# Patient Record
Sex: FEMALE | Race: WHITE | Employment: OTHER | URBAN - METROPOLITAN AREA
[De-identification: names, ages, dates, MRNs, and addresses within clinical notes are randomized per-mention and may not be internally consistent; named-entity substitution may affect disease eponyms.]

---

## 2020-01-01 ENCOUNTER — ANESTHESIA (OUTPATIENT)
Dept: EMERGENCY DEPT | Facility: HOSPITAL | Age: 85
DRG: 208 | End: 2020-01-01
Payer: MEDICARE

## 2020-01-01 ENCOUNTER — HOSPITAL ENCOUNTER (OUTPATIENT)
Facility: HOSPITAL | Age: 85
Setting detail: OBSERVATION
Discharge: NURSING, REHAB OR LONG-TERM CARE | End: 2020-01-01
Attending: EMERGENCY MEDICINE | Admitting: HOSPITALIST
Payer: MEDICARE

## 2020-01-01 ENCOUNTER — APPOINTMENT (OUTPATIENT)
Dept: GENERAL RADIOLOGY | Age: 85
End: 2020-01-01
Attending: EMERGENCY MEDICINE
Payer: MEDICARE

## 2020-01-01 ENCOUNTER — APPOINTMENT (OUTPATIENT)
Dept: CV DIAGNOSTICS | Facility: HOSPITAL | Age: 85
DRG: 208 | End: 2020-01-01
Attending: HOSPITALIST
Payer: MEDICARE

## 2020-01-01 ENCOUNTER — APPOINTMENT (OUTPATIENT)
Dept: CT IMAGING | Age: 85
End: 2020-01-01
Attending: EMERGENCY MEDICINE
Payer: MEDICARE

## 2020-01-01 ENCOUNTER — HOSPITAL ENCOUNTER (INPATIENT)
Facility: HOSPITAL | Age: 85
LOS: 4 days | DRG: 208 | End: 2020-01-01
Attending: EMERGENCY MEDICINE | Admitting: HOSPITALIST
Payer: MEDICARE

## 2020-01-01 ENCOUNTER — APPOINTMENT (OUTPATIENT)
Dept: GENERAL RADIOLOGY | Facility: HOSPITAL | Age: 85
DRG: 208 | End: 2020-01-01
Attending: EMERGENCY MEDICINE
Payer: MEDICARE

## 2020-01-01 ENCOUNTER — ANESTHESIA EVENT (OUTPATIENT)
Dept: EMERGENCY DEPT | Facility: HOSPITAL | Age: 85
DRG: 208 | End: 2020-01-01
Payer: MEDICARE

## 2020-01-01 ENCOUNTER — APPOINTMENT (OUTPATIENT)
Dept: GENERAL RADIOLOGY | Facility: HOSPITAL | Age: 85
DRG: 208 | End: 2020-01-01
Attending: INTERNAL MEDICINE
Payer: MEDICARE

## 2020-01-01 VITALS
DIASTOLIC BLOOD PRESSURE: 44 MMHG | WEIGHT: 141 LBS | SYSTOLIC BLOOD PRESSURE: 130 MMHG | RESPIRATION RATE: 17 BRPM | HEART RATE: 72 BPM | OXYGEN SATURATION: 95 % | TEMPERATURE: 98 F | BODY MASS INDEX: 23 KG/M2

## 2020-01-01 VITALS
WEIGHT: 148.13 LBS | BODY MASS INDEX: 27.26 KG/M2 | HEART RATE: 109 BPM | HEIGHT: 62 IN | SYSTOLIC BLOOD PRESSURE: 142 MMHG | OXYGEN SATURATION: 79 % | RESPIRATION RATE: 16 BRPM | DIASTOLIC BLOOD PRESSURE: 46 MMHG | TEMPERATURE: 101 F

## 2020-01-01 DIAGNOSIS — I50.9 ACUTE ON CHRONIC CONGESTIVE HEART FAILURE, UNSPECIFIED HEART FAILURE TYPE (HCC): ICD-10-CM

## 2020-01-01 DIAGNOSIS — R53.1 WEAKNESS: Primary | ICD-10-CM
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
## 2020-01-01 DIAGNOSIS — J81.0 ACUTE PULMONARY EDEMA (HCC): Primary | ICD-10-CM

## 2020-01-01 DIAGNOSIS — J96.91 RESPIRATORY FAILURE WITH HYPOXIA, UNSPECIFIED CHRONICITY (HCC): ICD-10-CM

## 2020-01-01 PROCEDURE — 99213 OFFICE O/P EST LOW 20 MIN: CPT | Performed by: FAMILY MEDICINE

## 2020-01-01 PROCEDURE — 71045 X-RAY EXAM CHEST 1 VIEW: CPT | Performed by: EMERGENCY MEDICINE

## 2020-01-01 PROCEDURE — 5A1945Z RESPIRATORY VENTILATION, 24-96 CONSECUTIVE HOURS: ICD-10-PCS | Performed by: ANESTHESIOLOGY

## 2020-01-01 PROCEDURE — 99225 SUBSEQUENT OBSERVATION CARE: CPT | Performed by: HOSPITALIST

## 2020-01-01 PROCEDURE — 71045 X-RAY EXAM CHEST 1 VIEW: CPT | Performed by: INTERNAL MEDICINE

## 2020-01-01 PROCEDURE — 93306 TTE W/DOPPLER COMPLETE: CPT | Performed by: HOSPITALIST

## 2020-01-01 PROCEDURE — 99223 1ST HOSP IP/OBS HIGH 75: CPT | Performed by: HOSPITALIST

## 2020-01-01 PROCEDURE — 99202 OFFICE O/P NEW SF 15 MIN: CPT | Performed by: FAMILY MEDICINE

## 2020-01-01 PROCEDURE — 99223 1ST HOSP IP/OBS HIGH 75: CPT | Performed by: NURSE PRACTITIONER

## 2020-01-01 PROCEDURE — 99233 SBSQ HOSP IP/OBS HIGH 50: CPT | Performed by: HOSPITALIST

## 2020-01-01 PROCEDURE — 99220 INITIAL OBSERVATION CARE,LEVL III: CPT | Performed by: HOSPITALIST

## 2020-01-01 PROCEDURE — 0BH18EZ INSERTION OF ENDOTRACHEAL AIRWAY INTO TRACHEA, VIA NATURAL OR ARTIFICIAL OPENING ENDOSCOPIC: ICD-10-PCS | Performed by: ANESTHESIOLOGY

## 2020-01-01 PROCEDURE — 99233 SBSQ HOSP IP/OBS HIGH 50: CPT | Performed by: NURSE PRACTITIONER

## 2020-01-01 PROCEDURE — 70450 CT HEAD/BRAIN W/O DYE: CPT | Performed by: EMERGENCY MEDICINE

## 2020-01-01 PROCEDURE — 99217 OBSERVATION CARE DISCHARGE: CPT | Performed by: HOSPITALIST

## 2020-01-01 PROCEDURE — 99232 SBSQ HOSP IP/OBS MODERATE 35: CPT | Performed by: HOSPITALIST

## 2020-01-01 PROCEDURE — 36620 INSERTION CATHETER ARTERY: CPT | Performed by: NURSE PRACTITIONER

## 2020-01-01 PROCEDURE — 99232 SBSQ HOSP IP/OBS MODERATE 35: CPT | Performed by: INTERNAL MEDICINE

## 2020-01-01 RX ORDER — ACETAMINOPHEN 160 MG/5ML
650 SOLUTION ORAL EVERY 6 HOURS PRN
Status: DISCONTINUED | OUTPATIENT
Start: 2020-01-01 | End: 2020-04-23

## 2020-01-01 RX ORDER — CHLORTHALIDONE 25 MG/1
25 TABLET ORAL DAILY
Status: ON HOLD | COMMUNITY
Start: 2017-03-28 | End: 2020-01-01

## 2020-01-01 RX ORDER — ENOXAPARIN SODIUM 100 MG/ML
30 INJECTION SUBCUTANEOUS DAILY
Status: DISCONTINUED | OUTPATIENT
Start: 2020-01-01 | End: 2020-01-01 | Stop reason: ALTCHOICE

## 2020-01-01 RX ORDER — FAMOTIDINE 10 MG/ML
20 INJECTION, SOLUTION INTRAVENOUS 2 TIMES DAILY
Status: DISCONTINUED | OUTPATIENT
Start: 2020-01-01 | End: 2020-01-01

## 2020-01-01 RX ORDER — POTASSIUM CHLORIDE 29.8 MG/ML
40 INJECTION INTRAVENOUS ONCE
Status: COMPLETED | OUTPATIENT
Start: 2020-01-01 | End: 2020-01-01

## 2020-01-01 RX ORDER — SCOLOPAMINE TRANSDERMAL SYSTEM 1 MG/1
1 PATCH, EXTENDED RELEASE TRANSDERMAL
Status: DISCONTINUED | OUTPATIENT
Start: 2020-01-01 | End: 2020-04-23

## 2020-01-01 RX ORDER — CHLORHEXIDINE GLUCONATE 0.12 MG/ML
15 RINSE ORAL
Status: DISCONTINUED | OUTPATIENT
Start: 2020-01-01 | End: 2020-01-01

## 2020-01-01 RX ORDER — MORPHINE SULFATE 4 MG/ML
1 INJECTION, SOLUTION INTRAMUSCULAR; INTRAVENOUS ONCE
Status: DISCONTINUED | OUTPATIENT
Start: 2020-01-01 | End: 2020-01-01

## 2020-01-01 RX ORDER — FUROSEMIDE 10 MG/ML
80 INJECTION INTRAMUSCULAR; INTRAVENOUS ONCE
Status: COMPLETED | OUTPATIENT
Start: 2020-01-01 | End: 2020-01-01

## 2020-01-01 RX ORDER — POTASSIUM CHLORIDE 20 MEQ/1
40 TABLET, EXTENDED RELEASE ORAL EVERY 4 HOURS
Status: COMPLETED | OUTPATIENT
Start: 2020-01-01 | End: 2020-01-01

## 2020-01-01 RX ORDER — MIDAZOLAM HYDROCHLORIDE 5 MG/ML
2.5 INJECTION INTRAMUSCULAR; INTRAVENOUS ONCE
Status: COMPLETED | OUTPATIENT
Start: 2020-01-01 | End: 2020-01-01

## 2020-01-01 RX ORDER — ENOXAPARIN SODIUM 100 MG/ML
40 INJECTION SUBCUTANEOUS DAILY
Status: DISCONTINUED | OUTPATIENT
Start: 2020-01-01 | End: 2020-01-01

## 2020-01-01 RX ORDER — AMLODIPINE BESYLATE 10 MG/1
10 TABLET ORAL DAILY
Qty: 30 TABLET | Refills: 0 | Status: SHIPPED | OUTPATIENT
Start: 2020-01-01 | End: 2020-05-08

## 2020-01-01 RX ORDER — MORPHINE SULFATE 4 MG/ML
INJECTION, SOLUTION INTRAMUSCULAR; INTRAVENOUS
Status: COMPLETED
Start: 2020-01-01 | End: 2020-01-01

## 2020-01-01 RX ORDER — AMLODIPINE BESYLATE 5 MG/1
5 TABLET ORAL 2 TIMES DAILY
Status: DISCONTINUED | OUTPATIENT
Start: 2020-01-01 | End: 2020-01-01

## 2020-01-01 RX ORDER — IMIPRAMINE HCL 25 MG
25 TABLET ORAL NIGHTLY
Status: ON HOLD | COMMUNITY
End: 2020-01-01

## 2020-01-01 RX ORDER — DEXMEDETOMIDINE HYDROCHLORIDE 4 UG/ML
INJECTION, SOLUTION INTRAVENOUS CONTINUOUS
Status: DISCONTINUED | OUTPATIENT
Start: 2020-01-01 | End: 2020-04-23

## 2020-01-01 RX ORDER — OLMESARTAN MEDOXOMIL, AMLODIPINE AND HYDROCHLOROTHIAZIDE TABLET 40/10/25 MG 40; 10; 25 MG/1; MG/1; MG/1
1 TABLET ORAL DAILY
Status: DISCONTINUED | OUTPATIENT
Start: 2020-01-01 | End: 2020-01-01 | Stop reason: RX

## 2020-01-01 RX ORDER — HYDROXYUREA 500 MG/1
500 CAPSULE ORAL DAILY
Status: DISCONTINUED | OUTPATIENT
Start: 2020-01-01 | End: 2020-01-01

## 2020-01-01 RX ORDER — BISACODYL 10 MG
10 SUPPOSITORY, RECTAL RECTAL
Status: DISCONTINUED | OUTPATIENT
Start: 2020-01-01 | End: 2020-04-23

## 2020-01-01 RX ORDER — HYDROMORPHONE HYDROCHLORIDE 1 MG/ML
INJECTION, SOLUTION INTRAMUSCULAR; INTRAVENOUS; SUBCUTANEOUS
Status: DISPENSED
Start: 2020-01-01 | End: 2020-01-01

## 2020-01-01 RX ORDER — GLYCOPYRROLATE 0.2 MG/ML
0.4 INJECTION, SOLUTION INTRAMUSCULAR; INTRAVENOUS
Status: DISCONTINUED | OUTPATIENT
Start: 2020-01-01 | End: 2020-04-23

## 2020-01-01 RX ORDER — HYDRALAZINE HYDROCHLORIDE 20 MG/ML
10 INJECTION INTRAMUSCULAR; INTRAVENOUS EVERY 6 HOURS PRN
Status: DISCONTINUED | OUTPATIENT
Start: 2020-01-01 | End: 2020-01-01

## 2020-01-01 RX ORDER — AMLODIPINE BESYLATE 5 MG/1
10 TABLET ORAL DAILY
Status: DISCONTINUED | OUTPATIENT
Start: 2020-01-01 | End: 2020-01-01

## 2020-01-01 RX ORDER — POTASSIUM CHLORIDE 1.5 G/1.77G
POWDER, FOR SOLUTION ORAL
Status: COMPLETED
Start: 2020-01-01 | End: 2020-01-01

## 2020-01-01 RX ORDER — ATROPINE SULFATE 10 MG/ML
2 SOLUTION/ DROPS OPHTHALMIC EVERY 2 HOUR PRN
Status: DISCONTINUED | OUTPATIENT
Start: 2020-01-01 | End: 2020-04-23

## 2020-01-01 RX ORDER — ARIPIPRAZOLE 15 MG/1
20 TABLET ORAL ONCE
Status: DISCONTINUED | OUTPATIENT
Start: 2020-01-01 | End: 2020-01-01

## 2020-01-01 RX ORDER — ACETAMINOPHEN 325 MG/1
650 TABLET ORAL EVERY 6 HOURS PRN
Status: DISCONTINUED | OUTPATIENT
Start: 2020-01-01 | End: 2020-04-23

## 2020-01-01 RX ORDER — LOSARTAN POTASSIUM 100 MG/1
100 TABLET ORAL DAILY
Status: DISCONTINUED | OUTPATIENT
Start: 2020-01-01 | End: 2020-01-01

## 2020-01-01 RX ORDER — FUROSEMIDE 10 MG/ML
40 INJECTION INTRAMUSCULAR; INTRAVENOUS ONCE
Status: COMPLETED | OUTPATIENT
Start: 2020-01-01 | End: 2020-01-01

## 2020-01-01 RX ORDER — HYDROXYUREA 500 MG/1
500 CAPSULE ORAL DAILY
COMMUNITY

## 2020-01-01 RX ORDER — SPIRONOLACTONE 25 MG/1
25 TABLET ORAL ONCE
Status: DISCONTINUED | OUTPATIENT
Start: 2020-01-01 | End: 2020-01-01

## 2020-01-01 RX ORDER — FAMOTIDINE 10 MG/ML
20 INJECTION, SOLUTION INTRAVENOUS NIGHTLY
Status: DISCONTINUED | OUTPATIENT
Start: 2020-01-01 | End: 2020-01-01

## 2020-01-01 RX ORDER — ACETAMINOPHEN 325 MG/1
650 TABLET ORAL EVERY 6 HOURS PRN
Status: DISCONTINUED | OUTPATIENT
Start: 2020-01-01 | End: 2020-01-01

## 2020-01-01 RX ORDER — ASPIRIN 81 MG/1
81 TABLET, CHEWABLE ORAL DAILY
Status: DISCONTINUED | OUTPATIENT
Start: 2020-01-01 | End: 2020-01-01

## 2020-01-01 RX ORDER — HEPARIN SODIUM 5000 [USP'U]/ML
5000 INJECTION, SOLUTION INTRAVENOUS; SUBCUTANEOUS EVERY 12 HOURS SCHEDULED
Status: DISCONTINUED | OUTPATIENT
Start: 2020-01-01 | End: 2020-01-01

## 2020-01-01 RX ORDER — MORPHINE SULFATE 4 MG/ML
2 INJECTION, SOLUTION INTRAMUSCULAR; INTRAVENOUS ONCE
Status: DISCONTINUED | OUTPATIENT
Start: 2020-01-01 | End: 2020-01-01

## 2020-01-01 RX ORDER — FAMOTIDINE 10 MG/ML
INJECTION, SOLUTION INTRAVENOUS
Status: COMPLETED
Start: 2020-01-01 | End: 2020-01-01

## 2020-01-01 RX ORDER — POTASSIUM CHLORIDE 20 MEQ/1
40 TABLET, EXTENDED RELEASE ORAL ONCE
Status: COMPLETED | OUTPATIENT
Start: 2020-01-01 | End: 2020-01-01

## 2020-01-01 RX ORDER — BUMETANIDE 0.25 MG/ML
2 INJECTION, SOLUTION INTRAMUSCULAR; INTRAVENOUS ONCE
Status: COMPLETED | OUTPATIENT
Start: 2020-01-01 | End: 2020-01-01

## 2020-01-01 RX ORDER — SODIUM CHLORIDE 9 MG/ML
INJECTION, SOLUTION INTRAVENOUS ONCE
Status: COMPLETED | OUTPATIENT
Start: 2020-01-01 | End: 2020-01-01

## 2020-01-01 RX ORDER — SODIUM PHOSPHATE, DIBASIC AND SODIUM PHOSPHATE, MONOBASIC 7; 19 G/133ML; G/133ML
1 ENEMA RECTAL ONCE AS NEEDED
Status: DISCONTINUED | OUTPATIENT
Start: 2020-01-01 | End: 2020-04-23

## 2020-01-01 RX ORDER — POTASSIUM CHLORIDE 750 MG/1
10 TABLET, EXTENDED RELEASE ORAL DAILY
Status: DISCONTINUED | OUTPATIENT
Start: 2020-01-01 | End: 2020-01-01

## 2020-01-01 RX ORDER — FUROSEMIDE 10 MG/ML
40 INJECTION INTRAMUSCULAR; INTRAVENOUS ONCE
Status: DISCONTINUED | OUTPATIENT
Start: 2020-01-01 | End: 2020-01-01

## 2020-01-01 RX ORDER — FUROSEMIDE 10 MG/ML
INJECTION INTRAMUSCULAR; INTRAVENOUS
Status: COMPLETED
Start: 2020-01-01 | End: 2020-01-01

## 2020-01-01 RX ORDER — ONDANSETRON 2 MG/ML
4 INJECTION INTRAMUSCULAR; INTRAVENOUS EVERY 6 HOURS PRN
Status: DISCONTINUED | OUTPATIENT
Start: 2020-01-01 | End: 2020-01-01

## 2020-01-01 RX ORDER — ACETAMINOPHEN 650 MG/1
650 SUPPOSITORY RECTAL EVERY 6 HOURS PRN
Status: DISCONTINUED | OUTPATIENT
Start: 2020-01-01 | End: 2020-04-23

## 2020-01-01 RX ORDER — POLYETHYLENE GLYCOL 3350 17 G/17G
17 POWDER, FOR SOLUTION ORAL DAILY PRN
Status: DISCONTINUED | OUTPATIENT
Start: 2020-01-01 | End: 2020-04-23

## 2020-01-01 RX ORDER — AMLODIPINE BESYLATE 5 MG/1
5 TABLET ORAL 2 TIMES DAILY
Status: ON HOLD | COMMUNITY
End: 2020-01-01

## 2020-01-01 RX ORDER — LOSARTAN POTASSIUM 100 MG/1
100 TABLET ORAL DAILY
COMMUNITY

## 2020-04-01 PROBLEM — R53.1 WEAKNESS: Status: ACTIVE | Noted: 2020-01-01

## 2020-04-01 PROBLEM — E87.1 HYPONATREMIA: Status: ACTIVE | Noted: 2020-01-01

## 2020-04-01 PROBLEM — E87.6 HYPOKALEMIA: Status: ACTIVE | Noted: 2020-01-01
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
## 2020-04-01 PROBLEM — R79.89 AZOTEMIA: Status: ACTIVE | Noted: 2020-01-01

## 2020-04-01 PROBLEM — R73.9 HYPERGLYCEMIA: Status: ACTIVE | Noted: 2020-01-01

## 2020-04-01 NOTE — ED PROVIDER NOTES
Patient Seen in: [facility] In [place]      History   Patient presents with:  Dyspnea JUAN CARLOS SOB    Stated Complaint: SOB;sore throat    HPI    This is an 77-year-old woman history of aortic stenosis, brought by family from home for eval Edema  Pulmonary:  Pulmonary effort is normal.  Normal breath sounds. No wheezing, rhonchi or rales.    Abdominal: Soft nontender nondistended, no guarding no rebound tenderness  Skin: Warm and dry  Neurological: Awake alert, speech is normal strength is 4+ GREEN   RAINBOW DRAW GOLD   SARS-COV-2 BY PCR       ED Course as of Apr 01 2043  ------------------------------------------------------------  Time: 04/01 1841  Value: Potassium(!): 3.3  Comment: (Reviewed)     Ct Brain Or Head (35043)    Result Date: 4/1/ LAT CHEST (CNM=[id number]), 8/10/2014, 12:54 PM.  INDICATIONS:  SOB;sore throat  PATIENT STATED HISTORY: (As transcribed by Technologist)  For past week slightly productive cough.  For past few days slight short of breath/labored breathing as well as decrease ap encounter    Disposition:  Admit  4/1/2020  7:23 pm    Follow-up:  No follow-up provider specified.       Medications Prescribed:  Current Discharge Medication List                   Present on Admission  Date Reviewed: 8/7/2014          ICD-10-CM Noted POA

## 2020-04-01 NOTE — ED NOTES
Pt voided in the bed, linens changed, daughter at [facility]

## 2020-04-02 NOTE — PLAN OF CARE
Pt. A&O x4. [place] with bilateral hearing aids. Received pt. On 2L O2; sats high-mid 90's. Placed pt. On room air; O2 sats low-mid 90's. No c/o SOB. Pt. With strong non-productive cough. Purewick in place. Pt. Very weak and needing assistance with cares.  Fall

## 2020-04-02 NOTE — PHYSICAL THERAPY NOTE
Chart reviewed with orders received. Patient currently on isolation for R/O COVID-19. Per COVID-19 protocol therapy on hold until test results are made available. Will continue to follow peripherally.

## 2020-04-02 NOTE — PLAN OF CARE
A&Ox4. [place], even with hearing aides. Paper and pen at bedside to help with communication since pt unable to read lips d/t mask. Has a strong non productive cough. Incentive spirometer teaching done. Was on room air and desatted when asleep to 88%.  2L NC ap

## 2020-04-02 NOTE — CONSULTS
INFECTIOUS DISEASE CONSULT NOTE    Ran Cooper Patient Status:  Observation    6/15/1934 MRN [id number]   [facility] 5NW-A Attending Jp Jacques MD   Hosp Day # 0 PCP Rae Barboza tab 50 mg, 50 mg, Oral, Daily  •  Enoxaparin Sodium (LOVENOX) 40 MG/0.4ML injection 40 mg, 40 mg, Subcutaneous, Daily  •  acetaminophen (TYLENOL) tab 650 mg, 650 mg, Oral, Q6H PRN  •  ondansetron HCl (ZOFRAN) injection 4 mg, 4 mg, Intravenous, Q6H PRN  • Noncontrast CT scanning is performed through the brain. Dose reduction techniques were used. Dose information is transmitted to the [address] of Radiology) NRDR ([address]) which includes the Dose Index Registry.   PATIENT calcifications are seen. The heart size is within normal limits. There is calcified plaque seen within the thoracic aorta which is mildly tortuous  No infiltrate or consolidation. Calcified granuloma noted in the right lower lobe.   No pleural effusion o

## 2020-04-02 NOTE — PROGRESS NOTES
[facility]  Progress Note     Harpreet Ríos Patient Status:  Observation    6/15/1934 MRN [id number]   [facility] 5NW-A Attending Naomi Simpson MD   Hosp Day # 0 PCP Cameron Ronquillo     Chief Complaint: cough and sore throat, weakne Component Value Date    ELMER 96.6 04/02/2020    ELMER 81.2 04/01/2020       CPK  No results found for: CK    LDH  Lab Results   Component Value Date     04/02/2020     04/01/2020        D-Dimer  No results found for: DDIMER    Procalcitonin  L

## 2020-04-02 NOTE — PROGRESS NOTES
NURSING ADMISSION NOTE      Patient admitted via ambulance from [facility] ED  Oriented to room. Safety precautions initiated. Bed in low position. Call light in reach. Dr. Caitlin Moreira paged to update pt arrived to floor.

## 2020-04-02 NOTE — H&P
[facility]  History and Physical     Moni Joiner Patient Status:  Observation    6/15/1934 MRN [id number]   [facility] 5NW-A Attending Suzanne Blum, DO   Hosp Day # 0 PCP Donell Lerma     Chief Complaint: Cough/sore throat/wea Rfl:   Sertraline HCl 50 MG Oral Tab, Take 50 mg by mouth daily. , Disp: , Rfl:   TRIBENZOR 40-10-25 MG Oral Tab, Take 1 tablet by mouth daily. , Disp: , Rfl: 3  VENTOLIN  (90 BASE) MCG/ACT Inhalation Aero Soln, Inhale 2 puffs into the lungs every 6 ( HTN  1. Continue home meds  5. AS    Quality:  · DVT Prophylaxis: Lovenox  · CODE status: Full  · [place]choa: No    Plan of care discussed with patient.      Smita Porter DO  4/1/2020

## 2020-04-03 NOTE — PLAN OF CARE
A&Ox4. On RA when awake. O2 applied when asleep. Denies SOB. Continues to have  cough. Encouraging use of IS. Repositioning in bed with mod assist. VSS. BP improved. Call light within reach. Will continue to monitor.

## 2020-04-03 NOTE — PLAN OF CARE
Patient alert and oriented x4. States that she is feeling better, \"throat isn't as sore, and cough isn't as tight. \" Room air, maintaining saturations around 92%. Denies any difficulties breathing. IS encouraged. Tele, NSR.  Incontinent of bowel and bladde needed discharge resources and transportation as appropriate  - Identify discharge learning needs (meds, wound care, etc)  - Arrange for interpreters to assist at discharge as needed  - Consider post-discharge preferences of patient/family/discharge partne

## 2020-04-03 NOTE — PROGRESS NOTES
[facility]  Progress Note     Jinny Martínez Patient Status:  Observation    6/15/1934 MRN [id number]   [facility] 5NW-A Attending Tenisha Zapata MD   Hosp Day # 0 PCP Bryce Rodriguez     Chief Complaint: cough and sore throat, weakne 04/01/20  1743   TROP <0.045            COVID-19 Lab Results    COVID-19  Lab Results   Component Value Date    COVID19 Not Detected 04/01/2020       CRP  Lab Results   Component Value Date    CRP 4.86 (H) 04/02/2020    CRP 1.13 (H) 04/01/2020        Terrance Castillo discharge to: home    Plan of care discussed with patient    Yari Bonilla MD

## 2020-04-03 NOTE — PROGRESS NOTES
Spoke to Dr Vielka Park. OK to dc contact isolation. Keep on Droplet isolation. OK to transfer off COVID unit. Order also placed for respiratory viral panel.

## 2020-04-03 NOTE — PROGRESS NOTES
INFECTIOUS DISEASE PROGRESS NOTE    Julia Jean Baptiste Patient Status:  Observation    6/15/1934 MRN [id number]   [facility] 5NW-A Attending Diixe Lopez MD   Hosp Day # 0 PCP [place] 0.75  --   --    GFRAA 85 84  --   --    GFRNAA 74 73  --   --    CA 9.2 8.6  --   --    ALB 3.8 3.1*  --   --    * 136  --   --    K 3.3* 3.3* 3.8 4.0   CL 98 104  --   --    CO2 29.0 27.0  --   --    ALKPHO 70 65  --   --    AST 11* 13*  --   --

## 2020-04-03 NOTE — CM/SW NOTE
04/03/20 1000   CM/SW Referral Data   Referral Source Social Work (self-referral)   Reason for Referral Discharge planning   Informant Children   Patient Info   Patient's Mental Status Alert;Oriented   Patient's [address]   Number of Levels Worker/Discharge Planner  [phone]

## 2020-04-03 NOTE — PHYSICAL THERAPY NOTE
PT order received, chart reviewed. Pt tested negative for COVID-19 however per chart review, pt to be re-tested for. Per COVID-19 protocol therapy on hold until test results are made available. Will continue to follow peripherally.

## 2020-04-04 NOTE — CM/SW NOTE
SW placed call to pt's daughter, Magno Padilla ([phone]) and left voice message with request for call back to discuss PT recommendation for [facility] continue discharge planning discussion. Pt likely ready for discharge next 1-2 days.   / t

## 2020-04-04 NOTE — PLAN OF CARE
Assumed pt care at 0730. A&Ox4, [place]. VSS. NSR on tele. Room air. Regular diet, pt tolerating. R AC PIV SL. Purewick in place, briefed. Voiding. Denies pain, denies N/V. Lovenox SQ for VTE prevention.    Up SBA with walker, ambulated with PT today needed  - Consider post-discharge preferences of patient/family/discharge partner  - Complete POLST form as appropriate  - Assess patient's ability to be responsible for managing their own health  - Refer to Case Management Department for coordinating disc

## 2020-04-04 NOTE — PLAN OF CARE
Assumed care @ 2230. Patient is A&O x4.   Calm and cooperative. [facility]. VSS. On tele-NSR. On RA. Put on 2L O2 while sleeping as sats tend to dip down in high 80s. IV-SL. Denies any pain or discomfort. Purewick in place. assist at discharge as needed  - Consider post-discharge preferences of patient/family/discharge partner  - Complete POLST form as appropriate  - Assess patient's ability to be responsible for managing their own health  - Refer to Case Management Valentino Maza

## 2020-04-04 NOTE — PLAN OF CARE
Recommend [facility] or 24 hour supervision upon D/C.   Problem: Impaired Functional Mobility  Goal: Achieve highest/safest level of mobility/gait  Description  Interventions:  - Assess patient's functional ability and stability  - Promote increasing activity/tole

## 2020-04-04 NOTE — PHYSICAL THERAPY NOTE
PHYSICAL THERAPY EVALUATION - INPATIENT     Room Number: 9583/5805-C  Evaluation Date: 4/4/2020  Type of Evaluation: Initial  Physician Order: PT Eval and Treat    Presenting Problem: s/p fall at home with weakness  Reason for Therapy: Mobility Dysfu ROM and strength are within functional limits     Lower extremity ROM is within functional limits     Lower extremity strength is within functional limits     BALANCE  Static Sitting: Fair  Dynamic Sitting: Fair -  Static Standing: Poor +  Dynamic Standing with RW and CGA. Pt with significant shuffling and upon turning, pt having a difficult time maintaining her balance.     Exercise/Education Provided:  Bed mobility  Functional activity tolerated  Gait training  Transfer training    Patient End of Session: U #4    Goal #5    Goal #6    Goal Comments: Goals established on 4/4/2020

## 2020-04-04 NOTE — PROGRESS NOTES
[facility]  Progress Note     Gaby Oakes Patient Status:  Observation    6/15/1934 MRN [id number]   [facility] 3NE-A Attending Manohar Campbell MD   Hosp Day # 0 PCP Remy Booth     Chief Complaint: Weakness and a fall    S: Ronen Donnelly 24 17  --   --    BILT 1.0 0.9  --   --    TP 7.1 6.3*  --   --        CrCl cannot be calculated (Unknown ideal weight. ).     Recent Labs   Lab 04/01/20 1743   PTP 13.9   INR 1.08       Recent Labs   Lab 04/01/20 1743   TROP <0.045            Imaging: Shirley Morgan

## 2020-04-05 PROCEDURE — 99203 OFFICE O/P NEW LOW 30 MIN: CPT | Performed by: INTERNAL MEDICINE

## 2020-04-05 NOTE — OCCUPATIONAL THERAPY NOTE
OCCUPATIONAL THERAPY EVALUATION - INPATIENT     Room Number: 7851/0598-Z  Evaluation Date: 4/5/2020  Type of Evaluation: Initial  Presenting Problem: (s/p fall at home, weakness)    Physician Order: IP Consult to Occupational Therapy  Reason for Therapy: A 0  Location: (denies)       COGNITION  Overall Cognitive Status:  WFL - within functional limits    VISION  Current Vision: wears glasses    PERCEPTION  Overall Perception Status:   WFL - within functional limits    SENSATION  Light touch:  intact    Commu awareness with good return understanding. Pt benefits from some messages to be written down as opposed to verbally spoken d/t hard of hearing. Pt requiring min assist to perform supine<>sit, verbal cues for sequencing.  Once EOB, pt requiring max assist t OT to address the above deficits, maximizing patient’s ability to return safely to her prior level of function.     Patient Complexity  Occupational Profile/Medical History LOW - Brief history including review of medical or therapy records    Specific perfo

## 2020-04-05 NOTE — PHYSICAL THERAPY NOTE
PHYSICAL THERAPY TREATMENT NOTE - INPATIENT    Room Number: 2365/7876-B     Session:   Number of Visits to Meet Established Goals: 5    Presenting Problem: s/p fall at home with weakness. pt admitted with weakness and had a fall at home.  Pt r/o COVID and A Little   How much help from another person does the patient currently need. ..   -   Moving to and from a bed to a chair (including a wheelchair)?: A Little   -   Need to walk in hospital room?: A Little   -   Climbing 3-5 steps with a railing?: A Lot benefit from skilled PT services while at [facility] to maximize potential; will still benefit from [facility] after hospital dc prior to dc home.       DISCHARGE RECOMMENDATIONS  PT Discharge Recommendations: Sub-acute rehabilitation     PLAN  PT Treatment P

## 2020-04-05 NOTE — CONSULTS
[facility] Cardiology Consult      Reason for Consultation:  5 beat run NSVT    History of Present Illness:  Harpreet Ríos is a 80year old female who was admitted with weakness. She lives with her son and was on the floor when he returned home. in no acute distress   Neuro: awake/alert MILLER  HEENT: no JVD moist mucosa  Cardiac: S1 S2 regular 2/6 systolic murmur  Lungs: clear no wheeze few rhonci  Abdomen: Soft, nontender  Extremities: no LE edema  Skin:  UE eccymosis    Lab/Imaging  CT brain chron

## 2020-04-05 NOTE — PLAN OF CARE
Patient alert to baseline and resting in bed w/call light w/in reach. No c/o paint. PT saw patient-recommendation still [facility]. LM for Silva Birch, to return nurse call to give updates.   Patient up to chair after breakfast. Patient had 5 beats of V tach, system  Outcome: Progressing     Problem: Impaired Functional Mobility  Goal: Achieve highest/safest level of mobility/gait  Description  Interventions:  - Assess patient's functional ability and stability  - Promote increasing activity/tolerance for mobil

## 2020-04-05 NOTE — CM/SW NOTE
Dc planning update:   [facility] unable to take the pt. [facility] is still reviewing the case. / to remain available for support and/or discharge planning.      Elle Loja RN, [facility]    [phone]

## 2020-04-05 NOTE — PLAN OF CARE
Assumed care of patient at [address]. Isolation maintained. Monitor on tele-NSR,  on RA. Encourage IS. Fall precautions in place.  Will continue to monitor

## 2020-04-05 NOTE — PROGRESS NOTES
[facility]  Progress Note     Coral Aundrea Patient Status:  Observation    6/15/1934 MRN [id number]   [facility] 3NE-A Attending Tea Marks MD   Hosp Day # 0 PCP Shannon Darling     Chief Complaint: Weakness    S: \"I feel much GFRNAA 74 73  --   --    CA 9.2 8.6  --   --    ALB 3.8 3.1*  --   --    * 136  --   --    K 3.3* 3.3* 3.8 4.0   CL 98 104  --   --    CO2 29.0 27.0  --   --    ALKPHO 70 65  --   --    AST 11* 13*  --   --    ALT 24 17  --   --    BILT 1.0 0.9  -- rehab but Medicare may not cover this service because she has not been inpatient. The family will discuss this. If Yaakov Recinos is strong enough to go home, that may still be an option for her.       Manny Nolan MD

## 2020-04-05 NOTE — CM/SW NOTE
PT recommends [facility] for this patient. Call placed to the pts dtr in law, Madisyn Goetz. She would like referral to be sent to [facility], if PT is still recommending [facility].  PPer RN, PT is to see the pt again today - pending at this ti

## 2020-04-05 NOTE — HISTORICAL OFFICE NOTE
PATIENT:    Name: Twyla Shukla   MRN: [id number]    YOB: 1934 Age: 80 years    Gender: F    Admission status: Outpatient    Primary rhythm: sinus.    Height: 165.10 cm BSA: 1.96 m²    Weight: 83.46 kg  BMI: 30.6 kg/m²        Primary rhyt Point Velocity     1.6 m/s    Mitral A Point Velocity     1.9 m/s    TR Peak Velocity            2.7 m/s    TR Peak Gradient            29.4 mmHg    PV Peak Velocity            1.4 m/s    PV Peak Gradient            8 mmHg    RVOT Peak Velocity          1. 23 mmHg (peak velocity = 2.4 m/s). The mean gradient is 14 mmHg. AV area is 1.68 cm² (Indexed area: 0.86 cm²/m²). The dimensionless valve index is 0.53. The aortic valve Vmax is 2.4 m/s. The LVOT stroke volume index is 50.4 ml/m².  There is trivial aortic v

## 2020-04-06 NOTE — PLAN OF CARE
Patient here with rhinovirus. Very hard of hearing, even with hearing aids. On room air, sats >92%   VSS  Patient going to [facility] tomorrow, updated on plan  Droplet precautions maintained.      Problem: Patient/Family Goals  Goal: Patient/Family Long Term G responsible for managing their own health  - Refer to Case Management Department for coordinating discharge planning if the patient needs post-hospital services based on physician/LIP order or complex needs related to functional status, cognitive ability o

## 2020-04-06 NOTE — PHYSICAL THERAPY NOTE
PHYSICAL THERAPY TREATMENT NOTE - INPATIENT    Room Number: 9963/0691-Q     Session: 2  Number of Visits to Meet Established Goals: 5    Presenting Problem: s/p fall at home with weakness. pt admitted with weakness and had a fall at home.  Pt r/o COVID an of the bed?: None   How much help from another person does the patient currently need. ..   -   Moving to and from a bed to a chair (including a wheelchair)?: A Little   -   Need to walk in hospital room?: A Little   -   Climbing 3-5 steps with a railing?: training;Range of motion;Strengthening;Transfer training  Rehab Potential : Good  Frequency (Obs): 5x/week    CURRENT GOALS   Goal #1 Patient is able to demonstrate supine - sit EOB @ level: supervision      Goal #2 Patient is able to demonstrate transfers

## 2020-04-06 NOTE — OCCUPATIONAL THERAPY NOTE
OCCUPATIONAL THERAPY TREATMENT NOTE - INPATIENT     Room Number: 1121/8880-X  Session: 1/5  Number of Visits to Meet Established Goals: 5    Presenting Problem: (s/p fall at home, weakness)    History related to current admission:   Pt is an 80year old fe 18  Approx Degree of Impairment: 46.65%  Standardized Score (AM-PAC Scale): 38.66  CMS Modifier (G-Code): CK    FUNCTIONAL TRANSFER ASSESSMENT  Supine to Sit : Supervision(SBA, increased time)  Sit to Stand:  Moderate assistance    Skilled Therapy Provided: education;Patient/Family training; Compensatory technique education  Rehab Potential : Good  Frequency (Obs): 5x/week      OT Goals: all goals ongoing 4/6  ADL Goals   Patient will perform upper body dressing:  with supervision  Patient will perform lower b

## 2020-04-06 NOTE — CM/SW NOTE
1:32pm  [facility] can accept patient but can not until 4/7 because they need an ISO bed. Beside Rn updated by MSW. SW placed call to pt's family- Galen Roper ([phone]); left voice message with above info.

## 2020-04-06 NOTE — PLAN OF CARE
Assumed patient care @1900. Patient is a&ox4. On RA.  NSR on tele. Afebrile, VSS.  lovenox subq. No c/o of pain or discomfort. Droplet isolation maintained. Electrolyte protocol, K replaced today.  Redraw was 4.4 and Mag 2.0.  R arm precautions, despi etc)  - Arrange for interpreters to assist at discharge as needed  - Consider post-discharge preferences of patient/family/discharge partner  - Complete POLST form as appropriate  - Assess patient's ability to be responsible for managing their own health feeding, grooming, and bathing  - Educate and encourage patient/family in tolerated functional activity level and precautions during self-care     Outcome: Progressing

## 2020-04-06 NOTE — PROGRESS NOTES
[facility]  Progress Note     Tejas Cortez Patient Status:  Observation    6/15/1934 MRN [id number]   [facility] 3NE-A Attending Joselito Meneses MD   Hosp Day # 0 PCP Braxton Cardenas     Chief Complaint: Weakness and fall.     S: She extremities. Extremities: No edema. Mild tenderness bottom of both feet but normal color.       Diagnostic Data:      Labs:  Recent Labs   Lab 04/01/20  1743 04/02/20  0649   WBC 13.4* 10.7   HGB 13.3 13.3   MCV 98.3 99.8   .0 356.0   INR 1.08  -- facility agrees. 3. Hyponatremia  1. resolved  4. Essential hypertension          Several changes in blood pressure medications 4/6. Stopped diuretic and switched to plain Losartan. Metoprolol added 4/5 by cardiology. Potassium discontinued.   5. AS

## 2020-04-06 NOTE — PROGRESS NOTES
INFECTIOUS DISEASE PROGRESS NOTE    Anahi Rueda Patient Status:  Observation    6/15/1934 MRN [id number]   [facility] 5NW-A Attending Kat Lomax MD   Hosp Day # 0 PCP Santana Lucero 04/01/20  1743 04/02/20  0649   RBC 4.04 4.02   HGB 13.3 13.3   HCT 39.7 40.1   MCV 98.3 99.8   MCH 32.9 33.1   MCHC 33.5 33.2   RDW 13.8 13.9   NEPRELIM 11.09* 7.77*   WBC 13.4* 10.7   .0 356.0     Recent Labs   Lab 04/01/20 1743 04/02/20  0649  0 rhinovirus  - cxr neg. PCT neg. Mild leukocytosis with leukopenia on admission.   - No other complaints and UA clean.  Also, other members of the family with resp symptoms.   - Flu and RSV neg.   - Covid- 19 negative x 2.   - follow temps  - follow resp sta

## 2020-04-07 NOTE — PLAN OF CARE
Assumed patient care @1900. Patient a&ox4. On RA.  NSR on tele. Droplet isolation maintained. VSS. Afebrile. Lovenox subq. No c/o of pain or discomfort. [place] Codding in place overnight per patient request.  Accepted to [facility], will d/c today.   All nee of patient/family/discharge partner  - Complete POLST form as appropriate  - Assess patient's ability to be responsible for managing their own health  - Refer to Case Management Department for coordinating discharge planning if the patient needs post-hospi

## 2020-04-07 NOTE — DISCHARGE SUMMARY
[facility]  DISCHARGE SUMMARY     Junious Valera Patient Status:  Observation    6/15/1934 MRN [id number]   [facility] 3NE-A Attending No att. providers found   Hosp Day # 0 PCP Todd January     Date of Admission: 2020  Date of further work-up. Patient was stable for discharge and discharged in good condition.     Procedures during hospitalization:   • None    Incidental or significant findings and recommendations (brief descriptions):  • Per Brief Synopsis of Hospital Course were sent to [facility]/pharmacy #[id number] [facility], [address] [facility] [address], [phone], [address]  [facility] [address]    Phone:  [phone]   · amLODIPine Besylate 10 MG Tabs  · metoprolol Tartrate 25

## 2020-04-07 NOTE — PROGRESS NOTES
NURSING DISCHARGE NOTE    Discharged Rehab facility via Wheelchair. Accompanied by Support staff  Belongings Taken by patient/family. Called [facility] for report Timothy Gunn, Script, AVS, chart given to staff transporting patient.  Tele disconnected

## 2020-04-07 NOTE — PLAN OF CARE
Patient A&O x 4. Room air. NSR on tele. Cleared for discharge to [place]. SHONNA preparing for discharge and MD has completed med rec. All needs met at this time, Will continue to monitor.        Problem: Patient/Family Goals  Goal: Patient/Family Long Term

## 2020-04-07 NOTE — CM/SW NOTE
MSW spoke to Rn who states patient can dc today. MSW spoke to pt's dtr in law  Abhinav Wakefield who is agreeable to dc time  and to cost of medicar. MSW spoke to Erlinda from [facility] and they can accept today.  Per pt and dtr in law  request, MSW assisted pt in fill

## 2020-04-07 NOTE — PROGRESS NOTES
[facility]  Progress Note     Harpreet Michoacano Patient Status:  Observation    6/15/1934 MRN [id number]   [facility] 3NE-A Attending Alvin Shafer MD   Hosp Day # 0 PCP Cameron Ronquillo     Chief Complaint: rhinovirus   S:  Patient de 04/01/20  1743   TROP <0.045        Imaging: Imaging data reviewed in Epic.   Medications:   • Losartan Potassium  100 mg Oral Daily   • metoprolol Tartrate  25 mg Oral 2x Daily(Beta Blocker)   • aspirin  81 mg Oral Daily   • hydroxyurea  500 mg Oral Daily

## 2020-04-18 PROBLEM — J96.91 RESPIRATORY FAILURE WITH HYPOXIA, UNSPECIFIED CHRONICITY (HCC): Status: ACTIVE | Noted: 2020-01-01

## 2020-04-18 PROBLEM — D72.829 LEUKOCYTOSIS: Status: ACTIVE | Noted: 2020-01-01

## 2020-04-18 PROBLEM — I50.9 ACUTE ON CHRONIC CONGESTIVE HEART FAILURE, UNSPECIFIED HEART FAILURE TYPE (HCC): Status: ACTIVE | Noted: 2020-01-01

## 2020-04-18 PROBLEM — N17.9 ACUTE KIDNEY INJURY (HCC): Status: ACTIVE | Noted: 2020-01-01

## 2020-04-18 PROBLEM — J81.0 ACUTE PULMONARY EDEMA (HCC): Status: ACTIVE | Noted: 2020-01-01

## 2020-04-19 PROCEDURE — 99233 SBSQ HOSP IP/OBS HIGH 50: CPT | Performed by: INTERNAL MEDICINE

## 2020-04-19 NOTE — PROGRESS NOTES
[facility] Patient Status:  Inpatient    6/15/1934 MRN [id number]   [facility] 4SW-A Attending Goran Mcqueen MD   [facility] Day # 1  4Th [facility] / Critical Care Progress Note     S: remains intubated      [place] 04/18/20 1957   INR 1.04         Recent Labs   Lab 04/18/20 1957 04/19/20  0449    143   K 4.3 3.9    110   CO2 26.0 25.0   BUN 26* 33*     Creatinine (mg/dL)   Date Value   04/19/2020 1.36 (H)   04/18/2020 1.25 (H)   04/06/2020 0.77   ]    R

## 2020-04-19 NOTE — ANESTHESIA PROCEDURE NOTES
Airway  Date/Time: 4/18/2020 8:09 PM  Urgency: emergent    Airway not difficult    General Information and Staff    Patient location during procedure: ED  Anesthesiologist: Alex Craig DO  Performed: anesthesiologist     Consent for Airway (if perfo

## 2020-04-19 NOTE — PROGRESS NOTES
[facility] Pharmacy Note: Renal dose adjustment for Famotidine (Pepcid)  Izabella Leo has been prescribed Famotidine (Pepcid) 20 mg every 12 hours. Estimated Creatinine Clearance: 26 mL/min (A) (based on SCr of 1.25 mg/dL (H)).     Her calculated creatini

## 2020-04-19 NOTE — CONSULTS
[facility] PULMONARY/CRITICAL CARE CONSULTATION       HPI: Maribel Payton is a 80year old female with a history of Breast cancer, AS, HTN who presented to the ER with dyspnea which started today. She had worsening hypoxemia and ultimately was intubated in the ER.  Lenard Gould metoprolol Tartrate 25 MG Oral Tab   No No   Sig: Take 1 tablet (25 mg total) by mouth 2x Daily(Beta Blocker).       Facility-Administered Medications: None       CURRENT MEDICATIONS      Piperacillin Sod-Tazobactam So (ZOSYN) 3.375 g in dextrose 5 % 100 mL 2115 04/18/20 2130 04/18/20 2200   BP: (!) 164/72 156/70 108/53 109/52   Pulse: 105 101 102 90   Resp:  (!) 29 (!) 29 (!) 27   Temp:       TempSrc:       SpO2: 90% 93% 91% (!) 89%   Weight:          Ventilator Settings: VC 22// FIO2 100/PEEP 14 7. Dispo - DNR --pt has a signed DNR/DNI. Discussed w/ daughter now that patient is already intubated. They wish to proceed with current level of care but would not want CPR if she were to arrest.    35 min CC time      Bela Chavez M.D.   Pulmonary/Criti

## 2020-04-19 NOTE — CONSULTS
CARDIOLOGY SERVICE CONSULT      Patient: Harpreet Ríos Date: 2020     : 6/15/1934 Attending: Mica Ochoa MD   80year old female Requested by: Dr. Lubna Jernigan     A/P:  Acute hypoxic respiratory failure  Chronic HFpEF  Severe AS s/p TAVR (MG 14 on las name: Not on file      Number of children: Not on file      Years of education: Not on file      Highest education level: Not on file    Occupational History      Not on file    Social Needs      Financial resource strain: Not on file      Food insecurity: fentanyl 25 mcg/hr (04/19/20 0846)   • propofol 30 mcg/kg/min (04/19/20 1241)       Vital Last Value 24 Hour Range   Temperature 98.6 °F (37 °C) Temp  Min: 97.5 °F (36.4 °C)  Max: 99 °F (37.2 °C)   Pulse 66 Pulse  Min: 62  Max: 108   Respiratory 22 Resp  M ventricular systolic pressure is 32 mmHg. The right atrial pressure is 3 mmHg. -The left atrial size is mildly enlarged.  -There is mild mitral stenosis caused by calcification - annular.  There is mild mitral valve regurgitation.  -There is a Roya 3 pro

## 2020-04-19 NOTE — PROGRESS NOTES
[facility] Pharmacy Note:  Renal Dose Adjustment for Enoxaparin (LOVENOX)    Fabricio Eckert has been prescribed Enoxaparin (LOVENOX) 40 mg subcutaneously every 24 hours. Estimated Creatinine Clearance: 23.9 mL/min (A) (based on SCr of 1.36 mg/dL (H)).     Her calc

## 2020-04-19 NOTE — SEPSIS REASSESSMENT
[facility]    Sepsis Reassessment Note    BP 94/45   Pulse 87   Temp 98.8 °F (37.1 °C) (Temporal)   Resp (!)30   Ht 157.5 cm (5' 2\")   Wt 146 lb 9.7 oz (66.5 kg)   SpO2 93%   BMI 26.81 kg/m²      1:00 AM    Cardiac:  Regularity: Regular  Rate: Norm

## 2020-04-19 NOTE — H&P
[facility]  History and Physical     Astorga Filter Patient Status:  Inpatient    6/15/1934 MRN [id number]   [facility] 4SW-A Attending Malissa Jara MD   Hosp Day # 0 PCP Lianne Omer     Chief Complaint: cough and SOB    Histo (50 mg total) by mouth daily. , Disp: 30 tablet, Rfl: 0  losartan 100 MG Oral Tab, Take 100 mg by mouth daily. , Disp: , Rfl:   aspirin 81 MG Oral Tab, Take 81 mg by mouth daily. , Disp: , Rfl:   Mirabegron ER (MYRBETRIQ) 50 MG Oral Tablet 24 Hr, Take 50 mg status: DNR  · Ochoa: no    Plan of care discussed with patient's DIL at bedside    Fernando Vila MD  2/97/0637          **Certification      PHYSICIAN Certification of Need for Inpatient Hospitalization - Initial Certification    Patient will require inp

## 2020-04-19 NOTE — PROGRESS NOTES
Assessment and Objective  \"Progressing\"  INTERVENTIONS:  - Assess for changes in respiratory status  - Assess for changes in mentation and behavior  - Position to facilitate oxygenation and minimize respiratory effort  - Oxygen supplementation based on o Placement Verification: Auscultation;Capnometry  Placed By: Anesthesiologist   Secured at (cm) 26 cm   Suctioned?  N   Measured From Lips   Secured Location Left   Secured by Commercial tube godoy   Site Condition Dry   Req'd equipment at bedside Bag mask

## 2020-04-19 NOTE — PROGRESS NOTES
Arterial Line  Performed by: catrachito ledezma    Authorized by:  catrachito ledezma     General Information and Staff     Procedure Start: 5646  Patient Location:  ICU  Indication: continuous blood pressure monitoring and blood sampling needed    Site Identifica

## 2020-04-19 NOTE — ED PROVIDER NOTES
Patient Seen in: [facility] Emergency Department      History   Patient presents with:  Dyspnea JUAN CARLOS SOB    Stated Complaint: sob    HPI    Shortness of breath- ems called to house because patient had difficulty breathing - on arrival oxygen saturati atraumatic. Right Ear: External ear normal.      Left Ear: External ear normal.      Nose: Nose normal.   Eyes:      General: No scleral icterus. Right eye: No discharge. Left eye: No discharge.       Conjunctiva/sclera: Conjunctivae nor Behavior normal.         Judgment: Judgment normal.         22-year-old woman brought in on an EMS stretcher, she is noted to be in extremis, she is tachypneic she is not able to speak secondary to dyspnea. She has a nonrebreather on her face.   Prior to a Prelim 21.33 (*)     Neutrophil Absolute 21.33 (*)     Lymphocyte Absolute 13.87 (*)     Monocyte Absolute 3.19 (*)     Eosinophil Absolute 0.78 (*)     Basophil Absolute 0.31 (*)     All other components within normal limits   TROPONIN I - Normal   PROTHR Dictated by: Sabino Farah MD on 4/18/2020 at 8:39 PM         Finalized by: Sabino Farah MD on 4/18/2020 at 8:41 PM                       MDM     69-year-old woman presents to the emergency department in acute respiratory distress she has some gurgling here in the emergency department. I changed her ventilator settings initially because she had a tidal volume of 400, she is on assist control but with a PEEP of almost 14. Still with oxygen saturations only 89 to 90 to 91% here.   Patient was admitted to

## 2020-04-19 NOTE — ADDENDUM NOTE
Addendum  created 04/18/20 2035 by Cherylene Eaves, DO    Clinical Note Signed, Intraprocedure Blocks edited

## 2020-04-20 PROBLEM — Z71.89 GOALS OF CARE, COUNSELING/DISCUSSION: Status: ACTIVE | Noted: 2020-01-01

## 2020-04-20 PROBLEM — Z51.5 PALLIATIVE CARE ENCOUNTER: Status: ACTIVE | Noted: 2020-01-01

## 2020-04-20 PROCEDURE — 99291 CRITICAL CARE FIRST HOUR: CPT | Performed by: INTERNAL MEDICINE

## 2020-04-20 NOTE — PROGRESS NOTES
[facility]  Progress Note     Maribel Payton Patient Status:  Inpatient    6/15/1934 MRN [id number]   [facility] 4SW-A Attending Matthias Vazquez MD   [facility] Day # 1 PCP Andrew Avilez     Chief Complaint: sob    S: Patient intubated, sed Markers  Recent Labs   Lab 04/19/20  0449   CRP 6.90*   ELMER 116.5      DDIMER 1.20*         Imaging: Imaging data reviewed in Epic.     Medications:   • famoTIDine  20 mg Intravenous Nightly   • enoxaparin  30 mg Subcutaneous Daily   • sodium chlorid

## 2020-04-20 NOTE — CM/SW NOTE
04/20/20 1500   CM/SW Referral Data   Referral Source Social Work (self-referral)   Reason for Referral Psychoscial assessment   Informant Children   Social History   Recreational Drug/Alcohol Use no   Major Changes Last 6 Months no   Domestic/Partner V

## 2020-04-20 NOTE — CONSULTS
[address] [facility] Patient Status:  Inpatient    6/15/1934 MRN [id number]   [facility] 4SW-A Attending Sunil Lamb MD   [address] Day # 2 PCP Deena Jaiem     Date of Consult: 20   Today i son - Halle Marmolejo, and 1 dtr - Radha. Living Situation Prior to Admit: Home with family. Is patient confused?  CANDICE  Occupational History: CANDICE  Worship affiliation:  [employer] [facility] Not Listed    Functional Status: CANDICE - currently bedbound - intubated in ICU mL, 1 enema, Rectal, Once PRN  •  Chlorhexidine Gluconate (PERIDEX) 0.12 % solution 15 mL, 15 mL, Mouth/Throat, [email or web address]  •  propofol (DIPRIVAN) infusion, 5-100 mcg/kg/min, Intravenous, Continuous  No current outpatient medications on file.       Eamon Guzman --                    Vital Signs:  Blood pressure 129/35, pulse 76, temperature 98.8 °F (37.1 °C), temperature source Temporal, resp. rate 22, height 62\", weight 147 lb 11.3 oz (67 kg), SpO2 99 %. Body mass index is 27.02 kg/m².     Physical Exam:   Full brief update from chart review and d/w pt's RN, advising that nursing and pulm are placing calls to families with updates currently, and later relayed request to nursing and pulm.     Meanwhile, we reviewed the wishes as outline on pt's HCPOA document and h for her at the time document was created. Patient's preference about sharing medical information: ok to speak to [facility] as noted on form. .  Patient's decision making preferences: Unable, as above.     Psychosocial: Emotional support provided to patient's taking, physical examination, and > 50% was spent counseling and coordinating care. I discussed today's visit with patients THANIA Roche, and Dr. Tess Peck, and PS message to Dr. Tram Chang.     Thank you for inviting Palliative Care Service to participate in

## 2020-04-20 NOTE — PROGRESS NOTES
[facility]  Progress Note     Dede Lyles Patient Status:  Inpatient    6/15/1934 MRN [id number]   [facility] 4SW-A Attending Thelma Vasquez MD   [address] Day # 2 PCP Pamela Astudillo     Chief Complaint: sob    S: Patient intubated, sed 04/18/20 1957 04/18/20 2158 04/19/20  0449   TROP <0.045 0.508*  --    PBNP 1,851*  --  11,448*       Creatinine Kinase  No results for input(s): CK in the last 168 hours.     Inflammatory Markers  Recent Labs   Lab 04/19/20 0449 04/20/20  0454   CRP 6.9

## 2020-04-20 NOTE — PROGRESS NOTES
Seen and examined early this am. Reviewed with nursing staff at bedside    Afebrile  123/34  70 regular    Intubated, sedated and on vasopressor support    HEENT: ETT  Neck bounding carotid pulse  Lungs clear anteriorly  Ht RRR with soft OZYZ - audible S2

## 2020-04-20 NOTE — PROGRESS NOTES
[address] Patient Status:  Inpatient    6/15/1934 MRN [id number]   [facility] 4SW-A Attending Julio Fraser MD   [address] Day # 2 PCP [address] [address] ([facility])     Critical Care Progress Note     Date of Admission: 2020  7:54 P Rectal, Once PRN  •  Chlorhexidine Gluconate (PERIDEX) 0.12 % solution 15 mL, 15 mL, Mouth/Throat, [email or web address]  •  propofol (DIPRIVAN) infusion, 5-100 mcg/kg/min, Intravenous, Continuous     OBJECTIVE:  BP (!) 96/28   Pulse 69   Temp 98.7 °F (37.1 °C) COVID-19 Lab Results    COVID-19  Lab Results   Component Value Date    COVID19 Not Detected 04/19/2020    COVID19 Not Detected 04/18/2020    COVID19 Not Detected 04/18/2020       Pro-Calcitonin  Recent Labs   Lab 04/18/20 1957   PCT 0.07       Cardiac Would like to continue current level of care for now, as showing improvement from respiratory standpoint  -ok to transfer to CNICU given covid negative standpoint    Critical Care Time greater than: 35 minutes    Karolina Clark MD  4/20/2020  2:16 PM

## 2020-04-20 NOTE — CM/SW NOTE
Patient comes from [facility] and is being for COVID-19. This patient's COVID-19 test is pending. At the time of this note, there are known positive cases in that facility. 11:06  Per Jose Dates pt was discharge to home on Friday.  If pt need

## 2020-04-20 NOTE — PLAN OF CARE
Pt remains on vent FIO2 weaned down to 40% was 60% at 1900. Pts sats 99%. Pt appears comfortable. Remains on propofol, fentanyl and levo. Levo titrated to keep bp within ordered parameters. Ochoa cath in place with low urine output noted.  KEVIN Thurston no

## 2020-04-21 PROBLEM — Z51.5 END OF LIFE CARE: Status: ACTIVE | Noted: 2020-01-01

## 2020-04-21 PROCEDURE — 99291 CRITICAL CARE FIRST HOUR: CPT | Performed by: INTERNAL MEDICINE

## 2020-04-21 NOTE — RESPIRATORY THERAPY NOTE
Received pt vented, VC+ 22/420/40%/+5, tolerating well. No changes made. Will continue to monitor closely.

## 2020-04-21 NOTE — PROGRESS NOTES
Reviewed overnight course with nursing staff.     Progress made in weaning vasopressors - now on just 2 mcgs levophed - transient bradycardia when moved for bath yet resolved    Afebrile  68 regular  123/38  -- remains on propofol    HEENT: ETT  Neck strong

## 2020-04-21 NOTE — DIETARY NOTE
[facility]    NUTRITION INITIAL ASSESSMENT    Pt does not meet malnutrition criteria. NUTRITION DIAGNOSIS/PROBLEM:  Inadequate oral intake related to physiological causes as evidenced by vented, sedated, need for enteral nutrition support.     Lala Ochoa means of nutrition at goal to meet 100% patient nutrition prescription    MEDICATIONS:  Bumex, colace, pepcid, KCl IVF, levo, propofol, fentanyl.      LABS:  BUN/Cr: 56/1.88    Pt is at high nutrition risk    FOLLOW-UP DATE:  4/24    Tulio Jain MS, RD,

## 2020-04-21 NOTE — PROGRESS NOTES
04/21/20 1629   Clinical Encounter Type   Visited With Patient and family together;Health care provider  (THANIA Espinosa; pt.'s son)   Routine Visit   (Responded to request for consult - end of life care)   Crisis Visit Patient actively dying   [facility]

## 2020-04-21 NOTE — PLAN OF CARE
Assumed care of pt at [address] [facility]. Pt remains on the vent tolerating well. Now on 40% FIO2 Remains on Levo titrating to maintain ordered parameters. Ochoa with minimal but improved urine output. Lungs diminished abdomen soft bs hypoactive.  Covid negative pt to re

## 2020-04-21 NOTE — PROGRESS NOTES
[facility] Pharmacy Note:  Renal Adjustment for piperacillin/tazobactam (Everardo Yusuf)    Brook Ochoa is a 80year old female who has been prescribed piperacillin/tazobactam (ZOSYN) 3.375 g every 8 hrs.   CrCl is estimated creatinine clearance is 17.3 mL/min (A) (based o

## 2020-04-21 NOTE — PROGRESS NOTES
[address] Patient Status:  Inpatient    6/15/1934 MRN [id number]   [facility] 4SW-A Attending Trina Farrar MD   [address] Day # 3 PCP [address] [address] ([facility])     Critical Care Progress Note     Date of Admission: 2020  7:54 P bisacodyl (DULCOLAX) rectal suppository 10 mg, 10 mg, Rectal, Daily PRN  •  Fleet Enema (FLEET) 7-19 GM/118ML enema 133 mL, 1 enema, Rectal, Once PRN  •  Chlorhexidine Gluconate (PERIDEX) 0.12 % solution 15 mL, 15 mL, Mouth/Throat, [email or web address]  •  propof 6.4 04/21/2020     Lab Results   Component Value Date    INR 1.17 (H) 04/20/2020    INR 1.04 04/18/2020    INR 1.08 04/01/2020         COVID-19 Lab Results    COVID-19  Lab Results   Component Value Date    COVID19 Not Detected 04/19/2020    COVID19 Simba Hernández extubation family would want to proceed with comfort measures   -discussed case with patient's son and daughter, updates provided and all questions answered.       Critical Care Time greater than: 35 minutes    Karolina Clark MD

## 2020-04-21 NOTE — PAYOR COMM NOTE
--------------  ADMISSION REVIEW     Payor: Darvin Arroyo  Subscriber #:  [id number]  Authorization Number: [id number]    Admit date: 4/18/20  Admit time: 2326       Admitting Physician: Kamini Dove MD  Attending Physician:  Dickson Figueroa MD  [facility] SpO2 04/18/20 1957 (!) 79 %   O2 Device 04/18/20 2008 Other (Comment)     Current:/70   Pulse 90   Temp 98.6 °F (37 °C) (Temporal)   Resp (!) 27   Wt 63.5 kg   SpO2 90%   BMI 23.30 kg/m²      Physical Exam  Vitals signs and nursing note reviewed.    C General: No tenderness or deformity. Lymphadenopathy:      Cervical: No cervical adenopathy. Skin:     General: Skin is warm. Coloration: Skin is not pale. Findings: No erythema or rash.    Neurological:      General: No focal deficit prese ABG PANEL W ELECT AND LACTATE - Abnormal; Notable for the following components:    ABG pH 7.28 (*)     ABG pCO2 57 (*)     ABG pO2 69 (*)     ABG O2 Saturation 90 (*)     Calculated O2 Saturation 91 (*)     Lactic Acid Arterial 2.2 (*)     All other compon 35-year-old woman presents to the emergency department in acute respiratory distress she has some gurgling respirations and sounds volume overloaded actually very wet.   She presents to the emergency department with oxygen saturations of about 75% with decr PEEP of almost 14. Still with oxygen saturations only 89 to 90 to 91% here. Patient was admitted to the ICU total critical care time exclusive of procedure time in this patient was 45 minutes.     Admission disposition: 4/18/2020  8:56 PM    Disposition a History of Present Illness: Maribel Payton is a 80year old female with medical history of aortic stenosis status post TAVR, essential hypertension who was recently discharged from the hospital on April 7.   She was admitted with a mild COPD flare cough and bruce Cardiovascular: S1, S2. Regular rate and rhythm. No murmurs, rubs or gallops. Equal pulses. Abdomen: Soft, nontender, nondistended. Positive bowel sounds. No rebound, guarding or organomegaly. Extremities: trace edema  Integument: No rashes or lesions.  Ventilator Settings: VC 22// FIO2 100/PEEP 14     intubated, sedated, tachypneicRY/CRITICAL CARE CONSULTATION  ASSESSMENT AND PLAN     1.  Acute hypoxemic respiratory failure - intubated today, secondary to CHF (elevated BNP) vs. Pneumonia   -continu <0.30 mg/dL 6. 90High       Pro-Beta Natriuretic Peptide  <450 pg/mL 11,448High       MEDICATIONS ADMINISTERED IN LAST 1 DAY:  bumetanide (BUMEX) 0.25 MG/ML injection 2 mg     Date Action Dose Route User    4/21/2020 1004 Given 2 mg Intravenous Yue Fang Bet 4/21/2020 0600 Rate/Dose Change 8 mcg/min Intravenous Soraida Mayo RN    4/21/2020 2123 Rate/Dose Change 10 mcg/min Intravenous Soraida Mayo RN    4/21/2020 0550 Rate/Dose Change 7 mcg/min Intravenous Soraida Mayo RN    4/20/2020 1853 Ra 4/21/2020 0451 Rate/Dose Change 55 mcg/kg/min × 63.5 kg Intravenous Shanon Valentin RN    4/21/2020 0422 New Bag 45 mcg/kg/min × 63.5 kg Intravenous Shanon Valentin RN    4/20/2020 2255 New Bag 45 mcg/kg/min × 63.5 kg Intravenous Sofy ROSENTHAL

## 2020-04-21 NOTE — PLAN OF CARE
Assumed patient care at 0730. Vital signs stable - see EMAR for titration of levo. Patient's COVID test from ETT negative but patient to remain on isolation per ID due to positive cases at her nursing home.   Patient's family updated on plan of care by th administer replacement therapy as ordered  Outcome: Progressing     Problem: RESPIRATORY - ADULT  Goal: Achieves optimal ventilation and oxygenation  Description  INTERVENTIONS:  - Assess for changes in respiratory status  - Assess for changes in mentation

## 2020-04-21 NOTE — PLAN OF CARE
RECEIVED PT FOLLOWING AM REPORT. PT INTUBATED. ON SEDATION. THIS AFTERNOON SEDATION OFF AND WEANING TRIAL COMPLETED. PT TOLERATED. MD UPDATED FAMILY AND DISCUSSED CODE STATUS. OK TO EXTUBATE AND NOT TO REINTUBATE.  NOTIFIED MD AND APN OF HR AND BP DURING TR

## 2020-04-21 NOTE — PROGRESS NOTES
[address] [facility] 149 Follow Up    Coral Guillaume Patient Status:  Inpatient    6/15/1934 MRN [id number]   [facility] 4SW-A Attending Laney Barksdale MD   [facility] Day # 3 PCP Shannon Darling     Date of visit:  2020  Day 3 of hospi Inflammatory Markers  Recent Labs   Lab 04/19/20  0449 04/20/20  0454 04/21/20  0510   CRP 6.90* 17.00* 18.90*   ELMER 116.5 196.5 212.6    159 149   DDIMER 1.20*  --   --                    Vital Signs:  Blood pressure 140/75, pulse (!) 122, temper family requesting video call. Upon entry to pt's room, I began to dial son, Josefina Bowie, and noted pt with tachycardia, tachypnea (30s, Sats upper 80s on supplemental HFNC with copious secretions. I suctioned her as I called her son.  After suctioning, pt continue anticipated.     Problem List:       Patient Active Problem List:     Acute bronchospasm     Hypoxia     Urinary tract infection     Hyponatremia     Hypokalemia     Azotemia     Hyperglycemia     Weakness     Fall, initial encounter     Acute kidney injury

## 2020-04-21 NOTE — PROGRESS NOTES
04/21/20 1144   Spontaneous Parameters   $ Spontaneous Vital Capacity   (same as tidal volume)   Negative Inspiratory Force -23   Total RSBI 61   Weaning Trials   Patient self-extubated? No   Compassionate wean?  No   Spontaneous Breathing Trial Time Ini

## 2020-04-22 NOTE — PLAN OF CARE
Received patient after report. Patient on comfort care measures. Remains on room air. Morphine gtt infusing. Patient appears comfortable. Robinul given for secretions. Oral suctioning.  Daughter visiting at bedside per management, left to go home, belonging

## 2020-04-22 NOTE — PROGRESS NOTES
Palliative Care  Patient remains on comfort care. Spoke with RN and guidance provided regarding titration of morphine gtt, bolus dosing and O2 use. For any increase in respiratory distress, reinforced titration of morphine instead of increasing O2.  Patient

## 2020-04-22 NOTE — PLAN OF CARE
Pt  & son visited pt, pt family spoke with pt on phone x5 different members, pt non responsive, even to pain, im md aware. Appears comfortable, on 2l o2, npo not increase per palliate apn, will take off next time staff enters room.   Pt has temp this

## 2020-04-22 NOTE — PROGRESS NOTES
Comfort measures. Family able to come in and say goodbye. Nursing was great. We are signing off.   Thanks

## 2020-04-22 NOTE — PROGRESS NOTES
[facility]  Progress Note     Maribel Payton Patient Status:  Inpatient    6/15/1934 MRN [id number]   [facility] 4SW-A Attending Matthias Vazquez MD   [address] Day # 4 PCP Andrew Avilez     Chief Complaint: Shortness of breath    S: Cindy Mckeon 0.9   TP 6.7 6.2*  --  6.4       Estimated Creatinine Clearance: 17.3 mL/min (A) (based on SCr of 1.88 mg/dL (H)).     Recent Labs   Lab 04/18/20 1957 04/20/20  0454   PTP 13.9 15.3*   INR 1.04 1.17*            COVID-19 Lab Results    COVID-19  Lab Results this point Ms. Bhatt Forward is expected to be discharge to: tbd    Plan of care discussed with pt, rn and patient's family including patient's  and son at bedside    Paris Mondragon MD  4/22/2020

## 2020-04-22 NOTE — PROGRESS NOTES
Echocardiogram yesterday revealed hyperdynamic LV function and normally functioning  AV prosthesis - normal RV function as well. Notes reviewed. Febrile - Tmax 102.7    Patient extubated yesterday and made comfort care only. Will sign off.

## 2020-04-22 NOTE — PROGRESS NOTES
[facility]  Progress Note     Junious Valera Patient Status:  Inpatient    6/15/1934 MRN [id number]   [facility] 4SW-A Attending Dominga العراقي MD   [facility] Day # 3 PCP      Chief Complaint: sob    S: Patient extubated this Results    COVID-19  Lab Results   Component Value Date    COVID19 Not Detected 04/19/2020    COVID19 Not Detected 04/18/2020    COVID19 Not Detected 04/18/2020       Pro-Calcitonin  Recent Labs   Lab 04/18/20 1957   PCT 0.07       Cardiac  Recent Labs

## 2020-04-22 NOTE — PLAN OF CARE
Patient received from ICU. Oxygen saturations in 60's. Suctioned patient and put on 2L for comfort. Now saturations around 90%. A&O to person. Kept clean and dry. Ochoa in place. Resting comfortably in bed. Comfort measures in place.

## 2020-04-23 NOTE — DISCHARGE SUMMARY
[facility]  Discharge Summary/death note    Astorga Filter Patient Status:  Inpatient    6/15/1934 MRN [id number]   [facility] 5NW-A Attending No att. providers found   Hosp Day # 4 ANGELICA Omer     Date of Admission: 2020 worsen and family decided on comfort care only.   Patient weaned off of Levophed on 4/21/2020, had weaning trial and extubated on 4/21/2020 by pulmonary with no plans for escalation of care and if decompensates following extubation family wanted to proceed

## 2020-04-23 NOTE — PROGRESS NOTES
Patient passed and called @ 1930 by resource nurse, Jacoby MONTEIRO. Family called, POA informed. Wants an Autopsy and  put on case. [facility] aware.

## 2020-04-28 NOTE — PAYOR COMM NOTE
--------------  DISCHARGE REVIEW    Payor: Micheline Yeung  Subscriber #:  [id number]  Authorization Number: [id number]    Admit date: 4/18/20  Admit time:  2326  Discharge Date: 4/22/2020  7:30 PM     Admitting Physician: Tenisha Zapata MD  Attending was productive of whitish sputum. She had no fevers or chills. She was brought to the ER for further evaluation. She was noted to be in respiratory failure and was intubated in the emergency room.   Please refer to history and physical done by Dr. Luisito Ramos

## 2022-06-04 ENCOUNTER — TELEPHONE ENCOUNTER (OUTPATIENT)
Dept: URBAN - METROPOLITAN AREA CLINIC 68 | Facility: CLINIC | Age: 87
End: 2022-06-04

## 2022-06-05 ENCOUNTER — TELEPHONE ENCOUNTER (OUTPATIENT)
Dept: URBAN - METROPOLITAN AREA CLINIC 68 | Facility: CLINIC | Age: 87
End: 2022-06-05

## 2022-06-25 ENCOUNTER — TELEPHONE ENCOUNTER (OUTPATIENT)
Age: 87
End: 2022-06-25

## 2022-06-26 ENCOUNTER — TELEPHONE ENCOUNTER (OUTPATIENT)
Age: 87
End: 2022-06-26

## 2023-02-06 NOTE — ED NOTES
Report given to [facility], RN. skin normal color for race, warm, dry and intact.

## (undated) NOTE — IP AVS SNAPSHOT
[address]            (For Outpatient Use Only) Initial Admit Date: 4/1/2020   Inpt/Obs Admit Date: Inpt: N/A / Obs: 04/01/20   Discharge Date:    Donny Jimenez:  [de-identified]   MRN: [de-identified]   CSN: [id number]   CEID: [id number] April 7, 2020

## (undated) NOTE — IP AVS SNAPSHOT
Patient Demographics     Address  [address] [address] Phone  [phone] (Home) *Preferred*  [phone] [facility])      Emergency Contact(s)     Name Relation Home Work [place] Next of [facility] 81      Allergies as These medications were sent to [facility]/pharmacy #[id number]- [facility] [address], [phone], [address]  [facility] [address]    Phone:  [phone]   amLODIPine Besylate 10 MG Tabs  metoprolo Specimen:  Blood,peripheral      Blood Culture Result No Growth 4 Days    Blood Culture FREQ X 2 [[id number]] Collected:  04/02/20 1900    Order Status:  Completed Lab Status:  Preliminary result Updated:  04/06/20 2000    Specimen:  Blood,peripheral Specimen:  Other from Nasopharyngeal swab      SARS-CoV-2 (COVID-19) Not Detected      Pending Labs     Order Current Status    BLOOD CULTURE Preliminary result    BLOOD CULTURE Preliminary result         H&P - H&P Note      H&P signed by STEPHIE Paez Codeine                 NAUSEA ONLY[NG.2]    Medications:[NG.1]  No current facility-administered medications on file prior to encounter. losartan 100 MG Oral Tab, Take by mouth daily. , Disp: , Rfl:   amLODIPine Besylate 5 MG Oral Tab, Take 5 mg by mouth INR 1.08       Recent Labs   Lab 04/01/20  1743   *   BUN 16   CREATSERUM 0.74   GFRAA 85   GFRNAA 74   CA 9.2   ALB 3.8   *   K 3.3*   CL 98   CO2 29.0   ALKPHO 70   AST 11*   ALT 24   BILT 1.0   TP 7.1       CrCl cannot be calculated (Unknow She has had a cough for a few weeks, she was seen by ID had COVID testing thankfully negative (x2). Respiratory panel was positive for Rhinovirus, all others negative. Past history is obtained from [facility] Records.   She has h/ · Weakness, cough, fever improved. COVID negative twice. · NSVT with normal EF by Echo November and non obstructive CAD  · AS s/p TAVR Roya 3 [facility]. Echo November well seated valve peak gradient 23, mean gradient 14.   · Non obstructive C Therapy Assistant    Filed:  4/6/2020  1:40 PM Date of Service:  4/6/2020  9:20 AM Status:  Signed    :  Elijah Humphries PTA (Physical Therapy Assistant)        PHYSICAL THERAPY TREATMENT NOTE - INPATIENT    Room Number: 9135/0049-Y     Session: How much difficulty does the patient currently have. ..[CY.1]  -   Turning over in bed (including adjusting bedclothes, sheets and blankets)?: None   -   Sitting down on and standing up from a chair with arms (e.g., wheelchair, bedside commode, etc.): A Lot impaired balance below PLOF s/p fall at home  . Pt will continue to benefit from ongoing IP PT to maximize functional independence.   The AM-PAC '6-Clicks' Inpatient Basic Mobility Short Form was completed and this patient is demonstrating a 47% degree of Pt with h/o TAVR and HTN.    [AZ.3]     Problem List[AZ. 1]  Principal Problem:    Weakness  Active Problems:    Hyponatremia    Hypokalemia    Azotemia    Hyperglycemia    Fall, initial encounter[AZ.2]      Past Medical History[AZ.1]  Past Medical History: -   Need to walk in hospital room?: A [facility] 3-5 steps with a railing?: A Lot[AZ. 2]       AM-PAC Score:[AZ.1]  Raw Score: 17   Approx Degree of Impairment: 50.57%   Standardized Score (AM-PAC Scale): 42.13   CMS Modifier (G-Code): CK[AZ.2] maximize potential; will still benefit from IRVING after hospital dc prior to dc home.[AZ.3]      DISCHARGE RECOMMENDATIONS[AZ.1]  PT Discharge Recommendations: Sub-acute rehabilitation[AZ. 2]     PLAN[AZ. 1]  PT Treatment Plan: Bed mobility; Patient education;G Fall, initial encounter[AZ.2]      Past Medical History[AZ.1]  Past Medical History:   Diagnosis Date   • Breast cancer ([facility] [address] 2011   • Heart murmur    • Unspecified essential hypertension[AZ.2]        Past Surgical History[AZ.1]  Past Surgical History: Approx Degree of Impairment: 50.57%   Standardized Score (AM-PAC Scale): 42.13   CMS Modifier (G-Code): CK[AZ.2]    FUNCTIONAL ABILITY STATUS  Gait Assessment[AZ. 1]   Gait Assistance: Contact guard assist  Distance (ft): 90,10  Assistive Device: Rolling wa PT Discharge Recommendations: Sub-acute rehabilitation[AZ. 2]     PLAN[AZ. 1]  PT Treatment Plan: Bed mobility; Patient education;Gait training;Range of motion;Strengthening;Transfer training  Rehab Potential : Good  Frequency (Obs): 5x/week[AZ. 2]    CURRENT • Breast cancer ([facility] [address] 2011   • Heart murmur    • Unspecified essential hypertension        Past Surgical History  Past Surgical History:   Procedure Laterality Date   • KNEE REPLACEMENT SURGERY Right    • LUMPECTOMY RIGHT Right 2011   • REMOVAL GALLBLADDER Functional mobility completed using RW with CGA for approx 40 feet to simulate HH distances. Slow shuffling gait and increased time and amount of shuffling noted when turning or approaching obstacles. Patient completed stair transfer, see PT note.   Jessica Patient will be supervision with bilateral AROM HEP (home exercise program).      Additional Goals:  Pt will return verbalize at least one learned energy conservation/work simplification strategy and incorporate during therapy session during an ADL task or Toilet and Equipment: Standard height toilet; toilet riser with arms  Shower/Tub and Equipment: Walk-in shower  Other Equipment: Other (Comment)(rollator)       Hand Dominance: Right  Drives: No  Patient Regularly Uses: Glasses; Hearing aides    Prior Level -   Putting on and taking off regular lower body clothing?: A Lot  -   Bathing (including washing, rinsing, drying)?: A Lot  -   Toileting, which includes using toilet, bedpan or urinal? : A Little  -   Putting on and taking off regular upper body clothing Patient End of Session: Up in chair;Needs met;Call light within reach;RN aware of session/findings; All patient questions and concerns addressed    ASSESSMENT     Patient is a 80year old female admitted on 4/1/2020 for weakness.  Complete medical history an Patient will perform upper body dressing:  with supervision  Patient will perform lower body dressing:  with supervision  Patient will perform toileting: with supervision and with adaptive equipment PRN    Functional Transfer Goals  Patient will perform al

## (undated) NOTE — LETTER
[address] [facility]     I agree to have a Peripherally Inserted Central Catheter (PICC) placed in my arm.    1. The PICC insertion procedure, care, maintenance, risks, benefits, and complications have been explained to me by my physic also discussed reasonable alternatives to the PICC, including risks, benefits, and side effects related to the alternatives and risks related to not receiving this procedure.     8.  I have expressed any questions about this procedure to my physician or the

## (undated) NOTE — LETTER
[address]     I agree to have a Peripherally Inserted Central Catheter (PICC) placed in my arm.    1. The PICC insertion procedure, care, maintenance, risks, benefits, and complications have been explained to me by my physic also discussed reasonable alternatives to the PICC, including risks, benefits, and side effects related to the alternatives and risks related to not receiving this procedure.     8.  I have expressed any questions about this procedure to my physician or the